# Patient Record
Sex: MALE | Race: WHITE | Employment: UNEMPLOYED | URBAN - METROPOLITAN AREA
[De-identification: names, ages, dates, MRNs, and addresses within clinical notes are randomized per-mention and may not be internally consistent; named-entity substitution may affect disease eponyms.]

---

## 2017-04-22 ENCOUNTER — HOSPITAL ENCOUNTER (EMERGENCY)
Age: 22
Discharge: HOME | End: 2017-04-22
Attending: EMERGENCY MEDICINE | Admitting: EMERGENCY MEDICINE
Payer: OTHER GOVERNMENT

## 2017-04-22 VITALS
OXYGEN SATURATION: 96 % | RESPIRATION RATE: 16 BRPM | SYSTOLIC BLOOD PRESSURE: 112 MMHG | DIASTOLIC BLOOD PRESSURE: 75 MMHG | TEMPERATURE: 97.5 F | HEART RATE: 76 BPM

## 2017-04-22 DIAGNOSIS — F10.920 ALCOHOL INTOXICATION, UNCOMPLICATED (HCC): Primary | ICD-10-CM

## 2017-04-22 LAB — ETHANOL SERPL-MCNC: 348 MG/DL

## 2017-04-22 PROCEDURE — 99285 EMERGENCY DEPT VISIT HI MDM: CPT

## 2017-04-22 PROCEDURE — 80307 DRUG TEST PRSMV CHEM ANLYZR: CPT | Performed by: EMERGENCY MEDICINE

## 2017-04-22 NOTE — ED NOTES
Pt remains resting with eyes closed. Skin warm and dry. Respirations even and unlabored. Pt awakens to voice, but returns promptly to sleep and only nods to questions. Family member remains at bedside.

## 2017-04-22 NOTE — ED PROVIDER NOTES
HPI Comments: 20 y.o. male with no significant past medical history who presents from car for evaluation of unresponsiveness. Per EMS, pt presents to the ED after drinking one bottle of tequila tonight. Pt's friends called EMS when they noticed that pt was less responsive while they were driving him home. Pt has also vomited tonight. There are no other acute medical concerns at this time. Per girlfriend who was with patient. No fall or injury to head. Full history, physical exam, and ROS unable to be obtained due to:  intoxication. Social hx: + EtOH use    Note written by Wilmer Kent, as dictated by LAVONNE Cordero 2:20 AM            The history is provided by the EMS personnel and the patient. History limited by: intoxication. No  was used. No past medical history on file. No past surgical history on file. No family history on file. Social History     Social History    Marital status: N/A     Spouse name: N/A    Number of children: N/A    Years of education: N/A     Occupational History    Not on file. Social History Main Topics    Smoking status: Not on file    Smokeless tobacco: Not on file    Alcohol use Not on file    Drug use: Not on file    Sexual activity: Not on file     Other Topics Concern    Not on file     Social History Narrative         ALLERGIES: Review of patient's allergies indicates no known allergies. Review of Systems   Unable to perform ROS: Other (intoxication)       There were no vitals filed for this visit. Physical Exam   Constitutional: He appears well-developed and well-nourished. HENT:   Head: Normocephalic and atraumatic. Eyes: Conjunctivae and EOM are normal. Pupils are equal, round, and reactive to light. Neck: Normal range of motion. Neck supple. Cardiovascular: Normal rate and regular rhythm. Pulmonary/Chest: Effort normal and breath sounds normal.   Neurological: He is alert.    Opens eyes to name and pain  Does smell of alcohol   Skin: Skin is warm and dry. Psychiatric: He has a normal mood and affect. His behavior is normal. Judgment and thought content normal.   Nursing note and vitals reviewed. MDM  Number of Diagnoses or Management Options  Alcohol intoxication, uncomplicated [facility]):   Diagnosis management comments: 19 yo male with alcohol intoxication. Pt will awake to name and pain. No distress. No signs of trauma. Girlfriend reporting no fall. P:iv fluid, monitor. Pt case including HPI, PE, and all available lab and radiology results has been discussed with attending physician, Dr. Milagro Breen. He will follow patient for dispo.              Amount and/or Complexity of Data Reviewed  Discuss the patient with other providers: yes (ER attending-Arpit)      ED Course       Procedures

## 2017-04-22 NOTE — ED TRIAGE NOTES
Pt drank a bottle of tequila. Friends were trying to drive him home when they realized he was unresponsive - EMS called. Pt began vomiting on arrival to ED. Pt arouses to voice, but not answering questions currently.

## 2017-04-22 NOTE — ED NOTES
Pt ambulatory in room. Gait steady. Pt tolerated PO fluids. MD reviewed discharge instructions and options with patient and patient verbalized understanding. RN reviewed discharge instructions using teachback method. Pt ambulated to exit with family member and they will take a cab home. No complaints or needs expressed at this time.

## 2017-04-22 NOTE — DISCHARGE INSTRUCTIONS
We hope that we have addressed all of your medical concerns. The examination and treatment you received in the Emergency Department were for an emergent problem and were not intended as complete care. It is important that you follow up with your healthcare provider(s) for ongoing care. If your symptoms worsen or do not improve as expected, and you are unable to reach your usual health care provider(s), you should return to the Emergency Department. Today's healthcare is undergoing tremendous change, and patient satisfaction surveys are one of the many tools to assess the quality of medical care. You may receive a survey from the [employer] regarding your experience in the Emergency Department. I hope that your experience has been completely positive, particularly the medical care that I provided. As such, please participate in the survey; anything less than excellent does not meet my expectations or intentions. [facility] and [employer] participate in nationally recognized quality of care measures. If your blood pressure is greater than 120/80, as reported below, we urge that you seek medical care to address the potential of high blood pressure, commonly known as hypertension. Hypertension can be hereditary or can be caused by certain medical conditions, pain, stress, or \"white coat syndrome. \"       Please make an appointment with your health care provider(s) for follow up of your Emergency Department visit. VITALS:   Patient Vitals for the past 8 hrs:   Temp Pulse Resp BP SpO2   04/22/17 0230 - - - 124/49 94 %   04/22/17 0223 - - - - 96 %   04/22/17 0222 96.5 °F (35.8 °C) 65 17 121/54 -          Thank you for allowing us to provide you with medical care today. We realize that you have many choices for your emergency care needs. Please choose us in the future for any continued health care needs.       Alison Bernal Zach Love, [address] [place] [address] 20.   Office: [phone]            Recent Results (from the past 24 hour(s))   ETHYL ALCOHOL    Collection Time: 04/22/17  2:25 AM   Result Value Ref Range    ALCOHOL(ETHYL),SERUM 348 (H) <10 MG/DL       No results found. Acute Alcohol Intoxication: Care Instructions  Your Care Instructions  You have had treatment to help your body rid itself of alcohol. Too much alcohol upsets the body's fluid balance. Your doctor may have given you fluids and vitamins. For some people, drinking too much alcohol is a one-time event. For others, it is an ongoing problem. In either case, it is serious. It can be life-threatening. Follow-up care is a key part of your treatment and safety. Be sure to make and go to all appointments, and call your doctor if you are having problems. It's also a good idea to know your test results and keep a list of the medicines you take. How can you care for yourself at home? · Be safe with medicines. Take your medicines exactly as prescribed. Call your doctor if you think you are having a problem with your medicine. · Your doctor may have prescribed disulfiram (Antabuse). Do not drink any alcohol while you are taking this medicine. You may have severe or even life-threatening side effects from even small amounts of alcohol. · If you were given medicine to prevent nausea, be sure to take it exactly as prescribed. · Before you take any medicine, tell your doctor if:  ¨ You have had a bad reaction to any medicines in the past.  ¨ You are taking other medicines, including over-the-counter ones, or have other health problems. ¨ You are or could be pregnant. · Be prepared to have some symptoms of withdrawal in the next few days. · Drink plenty of liquids in the next few days. · Seek help if you need it to stop drinking. Getting counseling and joining a support group can help you stay sober.  Try a support group such as Alcoholics Anonymous. · Avoid alcohol when you take medicines. It can react with many medicines and cause serious problems. When should you call for help? Call 911 anytime you think you may need emergency care. For example, call if:  · You feel confused and are seeing things that are not there. · You are thinking about killing yourself or hurting others. · You have a seizure. · You vomit blood or what looks like coffee grounds. Call your doctor now or seek immediate medical care if:  · You have trembling, restlessness, sweating, and other withdrawal symptoms that are new or that get worse. · Your withdrawal symptoms come back after not bothering you for days or weeks. · You can't stop vomiting. Watch closely for changes in your health, and be sure to contact your doctor if:  · You need help to stop drinking. Where can you learn more? Go to http://mckinley-palmira.info/. Enter T102 in the search box to learn more about \"Acute Alcohol Intoxication: Care Instructions. \"  Current as of: November 3, 2016  Content Version: 11.2  © 2272-3554 [employer]. Care instructions adapted under license by [employer] (which disclaims liability or warranty for this information). If you have questions about a medical condition or this instruction, always ask your healthcare professional. [address] any warranty or liability for your use of this information.